# Patient Record
Sex: FEMALE | Race: OTHER | HISPANIC OR LATINO
[De-identification: names, ages, dates, MRNs, and addresses within clinical notes are randomized per-mention and may not be internally consistent; named-entity substitution may affect disease eponyms.]

---

## 2022-06-22 ENCOUNTER — APPOINTMENT (OUTPATIENT)
Dept: INTERNAL MEDICINE | Facility: CLINIC | Age: 42
End: 2022-06-22

## 2022-06-22 PROBLEM — Z00.00 ENCOUNTER FOR PREVENTIVE HEALTH EXAMINATION: Status: ACTIVE | Noted: 2022-06-22

## 2025-04-08 ENCOUNTER — EMERGENCY (EMERGENCY)
Facility: HOSPITAL | Age: 45
LOS: 1 days | End: 2025-04-08
Attending: EMERGENCY MEDICINE
Payer: MEDICAID

## 2025-04-08 VITALS
SYSTOLIC BLOOD PRESSURE: 126 MMHG | TEMPERATURE: 98 F | HEIGHT: 63.39 IN | DIASTOLIC BLOOD PRESSURE: 81 MMHG | OXYGEN SATURATION: 98 % | RESPIRATION RATE: 18 BRPM | WEIGHT: 179.9 LBS | HEART RATE: 80 BPM

## 2025-04-08 RX ORDER — DIAZEPAM 2 MG/1
1 TABLET ORAL
Qty: 6 | Refills: 0
Start: 2025-04-08 | End: 2025-04-09

## 2025-04-08 RX ORDER — ACETAMINOPHEN 500 MG/5ML
975 LIQUID (ML) ORAL ONCE
Refills: 0 | Status: COMPLETED | OUTPATIENT
Start: 2025-04-08 | End: 2025-04-08

## 2025-04-08 RX ORDER — DIAZEPAM 2 MG/1
5 TABLET ORAL ONCE
Refills: 0 | Status: DISCONTINUED | OUTPATIENT
Start: 2025-04-08 | End: 2025-04-08

## 2025-04-08 RX ORDER — LIDOCAINE HYDROCHLORIDE 20 MG/ML
1 JELLY TOPICAL ONCE
Refills: 0 | Status: COMPLETED | OUTPATIENT
Start: 2025-04-08 | End: 2025-04-08

## 2025-04-08 RX ADMIN — Medication 975 MILLIGRAM(S): at 17:30

## 2025-04-08 RX ADMIN — DIAZEPAM 5 MILLIGRAM(S): 2 TABLET ORAL at 17:30

## 2025-04-08 RX ADMIN — LIDOCAINE HYDROCHLORIDE 1 PATCH: 20 JELLY TOPICAL at 17:30

## 2025-04-08 NOTE — ED PROVIDER NOTE - PATIENT PORTAL LINK FT
You can access the FollowMyHealth Patient Portal offered by [facility] by registering at the following website: [email or web address]. By joining [employer]’s FollowMyHealth portal, you will also be able to view your health information using other applications (apps) compatible with our system.

## 2025-04-08 NOTE — ED PROVIDER NOTE - PROGRESS NOTE DETAILS
I reviewed patient most likely diagnosis plantar, patient follow-up and return precautions with patient.  All questions answered.   Valerie ID [id number]  -Ancelmo Hammonds PA-C

## 2025-04-08 NOTE — ED ADULT NURSE NOTE - OBJECTIVE STATEMENT
44y f pt through , Valerie [id number], c/o mid/lower back pain; pt states has been in pain for 4 days; denies any trauma; pt states does sleep on an old mattress; which my be a contributing factor; aox3; no resp distress; no sob; no chest pain; no abd pain; no incontinence; no numbness/tingling; pt ambulating on own; pt medicated per order

## 2025-04-08 NOTE — ED ADULT NURSE NOTE - NSFALLUNIVINTERV_ED_ALL_ED
Bed/Stretcher in lowest position, wheels locked, appropriate side rails in place/Call bell, personal items and telephone in reach/Instruct patient to call for assistance before getting out of bed/chair/stretcher/Non-slip footwear applied when patient is off stretcher/[place] to call system/Physically safe environment - no spills, clutter or unnecessary equipment/Purposeful proactive rounding/Room/bathroom lighting operational, light cord in reach

## 2025-04-08 NOTE — ED PROVIDER NOTE - ATTENDING APP SHARED VISIT CONTRIBUTION OF CARE
patient with back pain that she feels is from sleeping on an old mattress   No bowel or bladder dysfunction, no sensory or motor loss in legs, no recent back surgeries or injections, no history of intravenous drug abuse, no fevers, chills or night sweats. No saddle numbness with wiping or self exam.   patient intact range of motion but some restriction secondary to pain  will offer analgesia and expectant management discussed    The patient was serially evaluated throughout emergency department course by the team. There was no acute deterioration up to this time in the emergency department. The patient has demonstrated clinical improvement and/or stability, feels better at this time according to emergency department team. Agree with goals/plan of emergency department care as described in this physician's electronic medical record, including diagnostics, therapeutics and consultation recommendation as clinically warranted. Will discharge home with close outpatient follow up with primary care physician/provider and specialist if necessary. The patient and/or family was educated on expectant management and return precautions concerning signs and features to return to the emergency department, in layman terms, including but not limited to: nausea, vomiting, fever, chills, the inability to eat, take medications, or drink, persistent/worsening symptoms or any concerns at all. There are no acute or immediate life threatening issues present on history, clinical exam, or any diagnostic evaluation. The patient is a safe disposition home, has capacity and insight into their condition, is ambulatory in the Emergency Department with no further questions and will follow up with their doctor(s) this week. Diagnosis, prognosis, natural history and treatment was discussed with patient and/or family. The patient and/or family were given the opportunity to ask questions and have them answered in full. The patient and/or family are with capacity and insight into the situation, treatment, risks, benefits, alternative therapies, and understand that they can ask any further questions if needed. Patient and/or family/guardian understands anticipatory guidance and was given strict return and follow up precautions. The patient and/or family/guardian has been informed of the necessity to follow up with the PMD/Clinic/follow up as provided within 2-3 days, and the patient and/or family/guardian reports understanding of above with capacity and insight. The patient and/or family/guardian were informed of any results of their tests and are were encouraged to follow up on the findings with their doctor as well as the need to inform their doctor of any results. All available results endorsed including unexpected incidental findings (copy of reports provided to patient). The patient and/or family/guardian are aware of the need to follow up with repeat testing as applicable and report understanding of the above with capacity and insight. The patient and/or family/guardian was made aware of any pending test results at the time of discharge and of the need to call back for the final results as well as the need to inform their doctor of the results.

## 2025-04-08 NOTE — ED PROVIDER NOTE - PHYSICAL EXAMINATION
GEN: Pt in NAD, non-toxic.  PSYCH: Affect appropriate.  EYES: Sclera white w/o injection.   ENT: Head NCAT. MMM. Neck supple FROM.   RESP: No evidence of respiratory distress.  CARDIAC: RRR  ABD: Abdomen soft, non-tender. No CVAT b/l.  VASC: 2+ dorsalis pedis pulses b/l. No edema or calf tenderness.  MSK: No joint erythema or obvious deformity. No obvious spinal deformity, no midline spinal ttp, no step-offs. +ttp b/l paraspinal lumbar musculature. FROM b/l LE.  NEURO: Normal and equal sensation and 5/5 strength of LE b/l. Steady gait. No clonus.  SKIN: No rashes on the trunk.

## 2025-04-08 NOTE — ED PROVIDER NOTE - CLINICAL SUMMARY MEDICAL DECISION MAKING FREE TEXT BOX
Most likely lumbar muscle strain, less likely referred pain/radiculopathy from herniated disc. No red flag sxs or physical exam findings for back pain to suggest malignancy, epidural abscess, cauda equina syndrome, or other ortho/neurosurgical emergency. No concern for vascular pathology including aortic dissection and high grade PAD. No concern for urgent/emergent  pathology, renal calculi, ascending UTI, retroperitoneal infection. Plan for pain control and reassess with likely DC, referral coordinator to arrange PCP f/u, spine f/u, PT f/u info. -Ancelmo Hammonds PA-C

## 2025-04-08 NOTE — ED PROVIDER NOTE - OBJECTIVE STATEMENT
44-year-old female with no known past medical history, following physicians presents to the emergency department complaining of back pain for 4 days.  Patient reports pain began after she awoke reports she is sleeping on an old mattress and feels that this is contributing to her pain.  Pain is located in bilateral paraspinal lumbar region.  Patient denies recent spinal instrumentation, prior spinal surgeries, history of IVDA, fevers, chills, unexplained weight changes, bladder or bowel incontinence or retention, saddle anesthesia, numbness, paresthesia, weakness, urinary complaints, rash.  Patient reports taking Tylenol and Aleve this morning without significant relief.  Patient denies any other complaints.  Patient reports she did not drive herself to the emergency department.

## 2025-04-08 NOTE — ED PROVIDER NOTE - NSFOLLOWUPINSTRUCTIONS_ED_ALL_ED_FT
(Danish)      1) Seguimiento con hollis médico de cabecera en 2-3 días.      Llame al centro de columna vertebral al [phone] para un seguimiento dentro de 1 semana      Lleve todos los resultados impresos para discutir con hollis proveedor.      2) Evite la actividad extenuante, nicci no descanse estrictamente en la cama. Muévase dami todo el día para evitar que la espalda se ponga más rígida. Use compresas tibias.      3) El dolor se puede controlar con Tylenol 1000 mg (2 tabletas extra akash) cada 8 horas y/o ibuprofeno 600 mg (3 tabletas de concentración regular) cada 8 horas. Aplique un parche de lidocaína de venta jude en el área, úselo según las indicaciones. Use Valium 5 mg según las indicaciones para los espasmos de espalda. NO conduzca, opere maquinaria ni jesús alcohol cuando tome Valium.      4) Continúe tomando todos los demás medicamentos según las indicaciones.      5) Regrese a la esvin de emergencias de inmediato si jes síntomas empeoran o persisten, fiebre, dolor nuevo o que empeora en el área afectada, dificultad para caminar, pérdida de peso, enrojecimiento de la espalda, dolor abdominal, vómitos, incontinencia (defecar u orinar), entumecimiento inusual, hormigueo, debilidad o si presenta cualquier otro síntoma preocupante o cuestionable.      -----      1) Follow-up with your PCP in 2-3 days.      Call the spine center at [phone] for a Follow-up within 1 week      Bring all printed results to discuss with your provider.    2) Avoid strenuous activity but do not strictly rest in bed. Move around throughout the day to prevent the back from becoming more stiff. Use warm compresses.    3) Pain can be managed with Tylenol 1000mg (2 extra strength tablets) every 8 hours and/or ibuprofen  600mg (3 regular strength tablets) every 8 hours. Apply an over the counter lidocaine patch to the area, use as directed. Use Valium 5mg as directed for back spasms. DO NOT drive, operate machinery, or drink alcohol when taking Valium.    4) Continue to take all other medications as directed.    5) Return to the emergency room immediately if your symptoms worsen or persist, fever, new or worsening pain in the affected area, difficulty walking, weight loss, redness of the back, abdominal pain, vomiting, incontinence (pooping or peeing yourself), unusual numbness, tingling, weakness, or if any other concerning or questionable symptoms.